# Patient Record
Sex: FEMALE | Race: BLACK OR AFRICAN AMERICAN | NOT HISPANIC OR LATINO | URBAN - METROPOLITAN AREA
[De-identification: names, ages, dates, MRNs, and addresses within clinical notes are randomized per-mention and may not be internally consistent; named-entity substitution may affect disease eponyms.]

---
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
## 2022-05-19 ENCOUNTER — APPOINTMENT (OUTPATIENT)
Dept: ORTHOPEDIC SURGERY | Facility: CLINIC | Age: 22
End: 2022-05-19
Payer: COMMERCIAL

## 2022-05-19 VITALS — BODY MASS INDEX: 21.12 KG/M2 | HEIGHT: 66.5 IN | WEIGHT: 133 LBS

## 2022-05-19 DIAGNOSIS — J45.909 UNSPECIFIED ASTHMA, UNCOMPLICATED: ICD-10-CM

## 2022-05-19 PROCEDURE — 99213 OFFICE O/P EST LOW 20 MIN: CPT

## 2022-05-19 PROCEDURE — 99214 OFFICE O/P EST MOD 30 MIN: CPT

## 2022-05-19 PROCEDURE — 73590 X-RAY EXAM OF LOWER LEG: CPT | Mod: LT

## 2022-06-27 NOTE — ASSESSMENT
[FreeTextEntry1] : General Dx discussion\par The patient was advised of the diagnosis. The natural history of the pathology was explained in full to the patient in layman's terms. All questions were answered. The risks and benefits of surgical and non-surgical treatment alternatives were explained in full to the patient. \par \par Delayed healing of stress fracture with anterior cortical fracture.\par Recommend bone stimulator.\par Continue CAM boot. Will start WBAT in boot.\par Follow up in 6 weeks.\par \par \par Entered by LENNY Collazo acting as scribe.\par \par -\par The documentation recorded by the scribe accurately reflects the service I personally performed and the decisions made by me.\par

## 2022-06-27 NOTE — IMAGING
[Fracture] : Fracture [FreeTextEntry9] : left anterior corticial trasnverse fracture line. sclerosis

## 2022-06-27 NOTE — DATA REVIEWED
[MRI] : MRI [Left] : left [Report was reviewed and noted in the chart] : The report was reviewed and noted in the chart [I reviewed the films/CD] : I reviewed the films/CD [FreeTextEntry1] : Tibia: intramedullary edema and anterior cortical fracture-

## 2022-06-27 NOTE — HISTORY OF PRESENT ILLNESS
[1] : 2 [Dull/Aching] : dull/aching [Student] : Work status: student [de-identified] : 22 yo female with pain in her left shin ongoing for several months, worsening in March. 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 She saw her  and an orthopedist in [place]. She had an xray on 3/30/22 which showed thickening of the mid diaphyseal tibial cortex. She was placed in a tall CAM boot NW. She then had an MRI on 5/1/22 which showed a stress fracture at the mid tibial diaphysis, involving the anterior cortex.  [] : no [FreeTextEntry1] : lt shin [FreeTextEntry5] : dancing over time  [de-identified] : [facility]

## 2022-06-27 NOTE — PHYSICAL EXAM
[Left] : left foot and ankle [5___[facility]unfilled[facility]5 [2+] : posterior tibialis pulse: 2+ [] : varicosities are not present [FreeTextEntry8] : Tenderness mid diaphysis anterior tibia [FreeTextEntry3] : Swelling mid shaft anterior tibia

## 2022-07-01 ENCOUNTER — APPOINTMENT (OUTPATIENT)
Dept: ORTHOPEDIC SURGERY | Facility: CLINIC | Age: 22
End: 2022-07-01

## 2022-07-01 VITALS — BODY MASS INDEX: 21.12 KG/M2 | WEIGHT: 133 LBS | HEIGHT: 66.5 IN

## 2022-07-01 PROCEDURE — 99213 OFFICE O/P EST LOW 20 MIN: CPT

## 2022-07-01 PROCEDURE — 73590 X-RAY EXAM OF LOWER LEG: CPT | Mod: LT

## 2022-07-01 NOTE — PHYSICAL EXAM
[Left] : left foot and ankle [5___] : [facility] 5[unfilled]/5 [2+] : posterior tibialis pulse: 2+ [] : varicosities are not present [FreeTextEntry3] : Swelling mid shaft anterior tibia [FreeTextEntry8] : Tenderness mid diaphysis anterior tibia

## 2022-07-01 NOTE — DISCUSSION/SUMMARY
[de-identified] : fracture improving with bone stim. continue PT and bone stim. dc cam boot in 2 weeks. fu 6 weeks for xr.  fu pmd regarding osteopenia eval\par \par The patient was advised of the diagnosis.  The natural history of the pathology was explained in full. All questions were answered.  The risks and benefits of conservative and interventional treatment alternatives were explained to the patient\par \par

## 2022-07-01 NOTE — IMAGING
[Fracture] : Fracture [Left] : left tibia/fibula [FreeTextEntry9] : left anterior corticial trasnverse fracture line. sclerosis and periosteal healing 2 cortices.

## 2022-07-01 NOTE — PHYSICAL EXAM
[Left] : left foot and ankle [5___] : [facility] 5[unfilled]/5 [2+] : posterior tibialis pulse: 2+ [] : varicosities are not present [FreeTextEntry3] : Swelling mid shaft anterior tibia [FreeTextEntry8] : Tenderness mid diaphysis anterior tibia

## 2022-07-01 NOTE — HISTORY OF PRESENT ILLNESS
[Left Leg] : left leg [0] : 0 [de-identified] : physical therapy [1] : 2 [Dull/Aching] : dull/aching [Student] : Work status: student [de-identified] : 20 yo female with pain in her left shin ongoing for several months, worsening in March. 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 She saw her  and an orthopedist in [place]. She had an xray on 3/30/22 which showed thickening of the mid diaphyseal tibial cortex. She was placed in a tall CAM boot NWB. She then had an MRI on 5/1/22 which showed a stress fracture at the mid tibial diaphysis, involving the anterior cortex. \par \par using boot and ambulating. no pain.  [] : no [FreeTextEntry1] : lt shin [FreeTextEntry5] : dancing over time  [de-identified] : [facility]

## 2022-07-01 NOTE — DISCUSSION/SUMMARY
[de-identified] : fracture improving with bone stim. continue PT and bone stim. dc cam boot in 2 weeks. fu 6 weeks for xr.  fu pmd regarding osteopenia eval\par \par The patient was advised of the diagnosis.  The natural history of the pathology was explained in full. All questions were answered.  The risks and benefits of conservative and interventional treatment alternatives were explained to the patient\par \par

## 2022-07-01 NOTE — HISTORY OF PRESENT ILLNESS
[Left Leg] : left leg [0] : 0 [de-identified] : physical therapy [1] : 2 [Dull/Aching] : dull/aching [Student] : Work status: student [de-identified] : 20 yo female with pain in her left shin ongoing for several months, worsening in March. 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 She saw her  and an orthopedist in [place]. She had an xray on 3/30/22 which showed thickening of the mid diaphyseal tibial cortex. She was placed in a tall CAM boot NWB. She then had an MRI on 5/1/22 which showed a stress fracture at the mid tibial diaphysis, involving the anterior cortex. \par \par using boot and ambulating. no pain.  [FreeTextEntry1] : lt shin [] : no [FreeTextEntry5] : dancing over time  [de-identified] : [facility]

## 2022-08-15 ENCOUNTER — APPOINTMENT (OUTPATIENT)
Dept: ORTHOPEDIC SURGERY | Facility: CLINIC | Age: 22
End: 2022-08-15

## 2022-08-15 VITALS — HEIGHT: 66.5 IN | BODY MASS INDEX: 21.12 KG/M2 | WEIGHT: 133 LBS

## 2022-08-15 DIAGNOSIS — M84.362A STRESS FRACTURE, LEFT TIBIA, INITIAL ENCOUNTER FOR FRACTURE: ICD-10-CM

## 2022-08-15 PROCEDURE — 99214 OFFICE O/P EST MOD 30 MIN: CPT

## 2022-08-15 PROCEDURE — 73590 X-RAY EXAM OF LOWER LEG: CPT | Mod: LT

## 2022-08-15 NOTE — DATA REVIEWED
[MRI] : MRI [Left] : left [Report was reviewed and noted in the chart] : The report was reviewed and noted in the chart [I reviewed the films/CD] : I reviewed the films/CD

## 2022-08-15 NOTE — PHYSICAL EXAM
[Left] : left foot and ankle [5___] : [facility] 5[unfilled]/5 [2+] : posterior tibialis pulse: 2+ [] : varicosities are not present [FreeTextEntry3] : Swelling mid shaft anterior tibia [FreeTextEntry8] : Tenderness mid diaphysis anterior tibia

## 2022-08-15 NOTE — IMAGING
[Left] : left tibia/fibula [Fracture] : Fracture [de-identified] : no ttp. able to single leg hop without pain [FreeTextEntry9] : fracture healed. minimal small shadow transverse fx line.

## 2022-08-15 NOTE — HISTORY OF PRESENT ILLNESS
[1] : 2 [Dull/Aching] : dull/aching [Student] : Work status: student [de-identified] : today she is doing well. no pain \par \par 20 yo female with pain in her left shin ongoing for several months, worsening in March. 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 She saw her  and an orthopedist in [place]. She had an xray on 3/30/22 which showed thickening of the mid diaphyseal tibial cortex. She was placed in a tall CAM boot NWB. She then had an MRI on 5/1/22 which showed a stress fracture at the mid tibial diaphysis, involving the anterior cortex. \par \par  [] : no [FreeTextEntry1] : lt shin [FreeTextEntry5] : dancing over time  [de-identified] : [facility]  [de-identified] : physical therapy

## 2022-08-15 NOTE — DISCUSSION/SUMMARY
[de-identified] : doing very well. progressive return to full activity. PT. fu 6 months. \par \par The patient was advised of the diagnosis.  The natural history of the pathology was explained in full. All questions were answered.  The risks and benefits of conservative and interventional treatment alternatives were explained to the patient\par \par

## 2023-08-25 NOTE — ED PEDIATRIC TRIAGE NOTE - PAIN: PRESENCE, MLM
Noted. Thank you! Yes can take both mirtazapine and buspirone.   Mirtazapine at bedtime or with dinner    Flonase is for runny nose, can take it for 3 months    If worsening confusion, to take her mom back to emergency room        Future Appointments   Date Time Provider [address]   8/31/2023  3:45 PM Alexander Bardales MD INFT DISEASE [facility]   9/6/2023  1:00 PM Timmy Martini, DO Neuro [facility] Neurology -   9/11/2023  2:45 PM Jay Norris PA-C  derm [facility]   10/11/2023 11:00 AM Bebeto Mcneil, DO [facility]   12/1/2023  3:30 PM Hodan Akins MD [facility]   12/11/2023  3:30 PM Coy Worthy, DO [facility] complains of pain/discomfort